# Patient Record
Sex: FEMALE | Race: WHITE | NOT HISPANIC OR LATINO | Employment: OTHER | URBAN - METROPOLITAN AREA
[De-identification: names, ages, dates, MRNs, and addresses within clinical notes are randomized per-mention and may not be internally consistent; named-entity substitution may affect disease eponyms.]

---

## 2023-10-04 ENCOUNTER — OFFICE VISIT (OUTPATIENT)
Dept: OBSTETRICS AND GYNECOLOGY | Facility: CLINIC | Age: 43
End: 2023-10-04
Payer: COMMERCIAL

## 2023-10-04 VITALS
WEIGHT: 132.81 LBS | DIASTOLIC BLOOD PRESSURE: 59 MMHG | HEIGHT: 66 IN | BODY MASS INDEX: 21.34 KG/M2 | SYSTOLIC BLOOD PRESSURE: 95 MMHG

## 2023-10-04 DIAGNOSIS — L72.0 INCLUSION CYST: Primary | ICD-10-CM

## 2023-10-04 DIAGNOSIS — F32.81 PMDD (PREMENSTRUAL DYSPHORIC DISORDER): ICD-10-CM

## 2023-10-04 PROCEDURE — 99204 PR OFFICE/OUTPT VISIT, NEW, LEVL IV, 45-59 MIN: ICD-10-PCS | Mod: S$GLB,,, | Performed by: OBSTETRICS & GYNECOLOGY

## 2023-10-04 PROCEDURE — 3008F BODY MASS INDEX DOCD: CPT | Mod: CPTII,S$GLB,, | Performed by: OBSTETRICS & GYNECOLOGY

## 2023-10-04 PROCEDURE — 3078F PR MOST RECENT DIASTOLIC BLOOD PRESSURE < 80 MM HG: ICD-10-PCS | Mod: CPTII,S$GLB,, | Performed by: OBSTETRICS & GYNECOLOGY

## 2023-10-04 PROCEDURE — 3078F DIAST BP <80 MM HG: CPT | Mod: CPTII,S$GLB,, | Performed by: OBSTETRICS & GYNECOLOGY

## 2023-10-04 PROCEDURE — 3008F PR BODY MASS INDEX (BMI) DOCUMENTED: ICD-10-PCS | Mod: CPTII,S$GLB,, | Performed by: OBSTETRICS & GYNECOLOGY

## 2023-10-04 PROCEDURE — 1159F PR MEDICATION LIST DOCUMENTED IN MEDICAL RECORD: ICD-10-PCS | Mod: CPTII,S$GLB,, | Performed by: OBSTETRICS & GYNECOLOGY

## 2023-10-04 PROCEDURE — 3074F PR MOST RECENT SYSTOLIC BLOOD PRESSURE < 130 MM HG: ICD-10-PCS | Mod: CPTII,S$GLB,, | Performed by: OBSTETRICS & GYNECOLOGY

## 2023-10-04 PROCEDURE — 1159F MED LIST DOCD IN RCRD: CPT | Mod: CPTII,S$GLB,, | Performed by: OBSTETRICS & GYNECOLOGY

## 2023-10-04 PROCEDURE — 3074F SYST BP LT 130 MM HG: CPT | Mod: CPTII,S$GLB,, | Performed by: OBSTETRICS & GYNECOLOGY

## 2023-10-04 PROCEDURE — 99204 OFFICE O/P NEW MOD 45 MIN: CPT | Mod: S$GLB,,, | Performed by: OBSTETRICS & GYNECOLOGY

## 2023-10-04 RX ORDER — METHOTREXATE 2.5 MG/1
TABLET ORAL
COMMUNITY
Start: 2023-04-15

## 2023-10-04 RX ORDER — ESCITALOPRAM OXALATE 20 MG/1
20 TABLET ORAL DAILY
Qty: 90 TABLET | Refills: 4 | Status: SHIPPED | OUTPATIENT
Start: 2023-10-04 | End: 2023-12-06 | Stop reason: SDUPTHER

## 2023-10-04 RX ORDER — ESCITALOPRAM OXALATE 10 MG/1
10 TABLET ORAL DAILY
Qty: 7 TABLET | Refills: 0 | Status: SHIPPED | OUTPATIENT
Start: 2023-10-04 | End: 2023-12-06 | Stop reason: DRUGHIGH

## 2023-10-04 NOTE — PROGRESS NOTES
"Gynecology Visit  History & Physical      SUBJECTIVE:     History of Present Illness:  Patient is a 43 y.o. female presents complaining of a cyst in her perineum.  Reports she noticed today while taking a shower.  Reports that it itches.    She is also complaining of severe mood swings surrounding her period. She reports a recent diagnosis of rheumatoid arthritis.  She is a hairdresser.  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  She is several hand surgery scheduled.      Review of patient's allergies indicates:  No Known Allergies    Current Outpatient Medications   Medication Sig Dispense Refill    INFUSION ADAPTER, CLOSED SYSTM MISC by Misc.(Non-Drug; Combo Route) route.      methotrexate 2.5 MG Tab SMARTSI Tablet(s) By Mouth Once a Week       No current facility-administered medications for this visit.     OB History          4    Para   3    Term   3            AB   1    Living             SAB   1    IAB        Ectopic        Multiple        Live Births                 Patient's last menstrual period was 2023.      Past Medical History:   Diagnosis Date    Rheumatoid arthritis, unspecified      Past Surgical History:   Procedure Laterality Date    TUBAL LIGATION       History reviewed. No pertinent family history.  Social History     Tobacco Use    Smoking status: Never    Smokeless tobacco: Never   Substance Use Topics    Alcohol use: Yes    Drug use: Never        OBJECTIVE:     Vital Signs (Most Recent)  BP: (!) 95/59 (10/04/23 1303)  5' 6" (1.676 m)  60.2 kg (132 lb 12.8 oz)     Physical Exam:  Physical Exam  Vitals reviewed.   Constitutional:       Appearance: Normal appearance.   HENT:      Head: Normocephalic and atraumatic.   Pulmonary:      Effort: Pulmonary effort is normal.   Genitourinary:         Comments: 0.5cm inclusion cyst present in the perineum  Neurological:      General: No focal deficit present.      Mental Status: She is alert and oriented to person, " place, and time.   Psychiatric:         Mood and Affect: Mood normal.         Behavior: Behavior normal.         ASSESSMENT/PLAN:       ICD-10-CM ICD-9-CM   1. Inclusion cyst  L72.0 706.2   2. PMDD (premenstrual dysphoric disorder)  F32.81 625.4       PLAN:    --Reviewed treatment options for inclusion cyst including observation versus surgical removal.  Patient desires observation.  If she desires surgical removal in the future, recommend 2nd opinion from a colorectal surgeon given location.  She is a history of C-sections and no vaginal deliveries or perineal lacerations.  --Recommend starting antidepressant for PMDD.  Patient reports taking Lexapro in the past with good results.  --Follow-up in 1 month to assess PMDD.  Will perform annual exam with Pap smear screening at that time.      Contraception:  Status post BTL      Ariadna Macias MD   Gynecology    [address]  [address]  [place] [address] [phone]

## 2023-12-06 ENCOUNTER — OFFICE VISIT (OUTPATIENT)
Dept: OBSTETRICS AND GYNECOLOGY | Facility: CLINIC | Age: 43
End: 2023-12-06
Payer: COMMERCIAL

## 2023-12-06 VITALS
HEIGHT: 66 IN | WEIGHT: 138.19 LBS | SYSTOLIC BLOOD PRESSURE: 110 MMHG | BODY MASS INDEX: 22.21 KG/M2 | DIASTOLIC BLOOD PRESSURE: 68 MMHG

## 2023-12-06 DIAGNOSIS — Z12.4 CERVICAL CANCER SCREENING: ICD-10-CM

## 2023-12-06 DIAGNOSIS — Z01.419 WOMEN'S ANNUAL ROUTINE GYNECOLOGICAL EXAMINATION: Primary | ICD-10-CM

## 2023-12-06 PROCEDURE — 3008F BODY MASS INDEX DOCD: CPT | Mod: CPTII,S$GLB,, | Performed by: OBSTETRICS & GYNECOLOGY

## 2023-12-06 PROCEDURE — 3074F PR MOST RECENT SYSTOLIC BLOOD PRESSURE < 130 MM HG: ICD-10-PCS | Mod: CPTII,S$GLB,, | Performed by: OBSTETRICS & GYNECOLOGY

## 2023-12-06 PROCEDURE — 3074F SYST BP LT 130 MM HG: CPT | Mod: CPTII,S$GLB,, | Performed by: OBSTETRICS & GYNECOLOGY

## 2023-12-06 PROCEDURE — 99396 PR PREVENTIVE VISIT,EST,40-64: ICD-10-PCS | Mod: S$GLB,,, | Performed by: OBSTETRICS & GYNECOLOGY

## 2023-12-06 PROCEDURE — 3078F PR MOST RECENT DIASTOLIC BLOOD PRESSURE < 80 MM HG: ICD-10-PCS | Mod: CPTII,S$GLB,, | Performed by: OBSTETRICS & GYNECOLOGY

## 2023-12-06 PROCEDURE — 88175 CYTOPATH C/V AUTO FLUID REDO: CPT | Performed by: OBSTETRICS & GYNECOLOGY

## 2023-12-06 PROCEDURE — 99396 PREV VISIT EST AGE 40-64: CPT | Mod: S$GLB,,, | Performed by: OBSTETRICS & GYNECOLOGY

## 2023-12-06 PROCEDURE — 3078F DIAST BP <80 MM HG: CPT | Mod: CPTII,S$GLB,, | Performed by: OBSTETRICS & GYNECOLOGY

## 2023-12-06 PROCEDURE — 3008F PR BODY MASS INDEX (BMI) DOCUMENTED: ICD-10-PCS | Mod: CPTII,S$GLB,, | Performed by: OBSTETRICS & GYNECOLOGY

## 2023-12-06 RX ORDER — ESCITALOPRAM OXALATE 20 MG/1
20 TABLET ORAL DAILY
Qty: 90 TABLET | Refills: 4 | Status: SHIPPED | OUTPATIENT
Start: 2023-12-06 | End: 2024-12-05

## 2023-12-06 NOTE — PROGRESS NOTES
"Annual Well Woman's Exam  History & Physical      SUBJECTIVE:     History of Present Illness:  Patient is a 43 y.o. female presents for her annual exam. Reports improved mood on Lexapro. Reports fatigue after her infusions for RA.     She recently bought pigtails and crewcuts and is planning to open this week.     Chief Complaint   Patient presents with    Well Woman       Review of patient's allergies indicates:  No Known Allergies    Current Outpatient Medications   Medication Sig Dispense Refill    EScitalopram oxalate (LEXAPRO) 10 MG tablet Take 1 tablet (10 mg total) by mouth once daily. For 1 week to titrate to Lexapro 20mg 7 tablet 0    EScitalopram oxalate (LEXAPRO) 20 MG tablet Take 1 tablet (20 mg total) by mouth once daily. 90 tablet 4    INFUSION ADAPTER, CLOSED SYSTM MISC by Misc.(Non-Drug; Combo Route) route.      methotrexate 2.5 MG Tab SMARTSI Tablet(s) By Mouth Once a Week       No current facility-administered medications for this visit.     OB History          4    Para   3    Term   3            AB   1    Living             SAB   1    IAB        Ectopic        Multiple        Live Births                 Patient's last menstrual period was 2023.      Past Medical History:   Diagnosis Date    Rheumatoid arthritis, unspecified      Past Surgical History:   Procedure Laterality Date    TUBAL LIGATION       No family history on file.  Social History     Tobacco Use    Smoking status: Never    Smokeless tobacco: Never   Substance Use Topics    Alcohol use: Yes    Drug use: Never        OBJECTIVE:     Vital Signs (Most Recent)  BP: 110/68 (23 1336)  5' 6" (1.676 m)  62.7 kg (138 lb 3.2 oz)     Physical Exam:  Physical Exam  Vitals reviewed.   Constitutional:       Appearance: Normal appearance.   HENT:      Head: Normocephalic.   Neck:      Thyroid: No thyroid mass, thyromegaly or thyroid tenderness.   Pulmonary:      Effort: Pulmonary effort is normal.   Chest: "   Breasts:     Right: Normal.      Left: Normal.      Comments: Bilateral breast implants present  Abdominal:      General: Abdomen is flat.      Palpations: Abdomen is soft.   Genitourinary:     General: Normal vulva.      Vagina: Normal.      Cervix: Normal.      Uterus: Normal.       Adnexa: Right adnexa normal and left adnexa normal.   Lymphadenopathy:      Upper Body:      Right upper body: No axillary adenopathy.      Left upper body: No axillary adenopathy.   Skin:     General: Skin is warm and dry.   Neurological:      General: No focal deficit present.      Mental Status: She is alert and oriented to person, place, and time.   Psychiatric:         Mood and Affect: Mood normal.         Behavior: Behavior normal.         ASSESSMENT/PLAN:       ICD-10-CM ICD-9-CM   1. Women's annual routine gynecological examination  Z01.419 V72.31   2. Cervical cancer screening  Z12.4 V76.2       PLAN:    Pap today  Mammogram ordered.  Lexapro refill ordered  Contraception: BTL  Follow up in 1 year for annual exam or sooner as needed    Ariadna Macias MD   Gynecology    [address]  [address]  [place] [address] [phone]

## 2023-12-17 LAB
FINAL PATHOLOGIC DIAGNOSIS: NORMAL
Lab: NORMAL

## 2024-05-07 ENCOUNTER — OFFICE VISIT (OUTPATIENT)
Dept: OBSTETRICS AND GYNECOLOGY | Facility: CLINIC | Age: 44
End: 2024-05-07
Payer: COMMERCIAL

## 2024-05-07 VITALS
DIASTOLIC BLOOD PRESSURE: 72 MMHG | WEIGHT: 139.63 LBS | BODY MASS INDEX: 22.44 KG/M2 | HEIGHT: 66 IN | SYSTOLIC BLOOD PRESSURE: 118 MMHG

## 2024-05-07 DIAGNOSIS — D72.819 LEUKOPENIA, UNSPECIFIED TYPE: ICD-10-CM

## 2024-05-07 DIAGNOSIS — R53.83 FATIGUE, UNSPECIFIED TYPE: ICD-10-CM

## 2024-05-07 DIAGNOSIS — E03.8 SUBCLINICAL HYPOTHYROIDISM: ICD-10-CM

## 2024-05-07 DIAGNOSIS — N89.8 VAGINAL ITCHING: Primary | ICD-10-CM

## 2024-05-07 PROCEDURE — 3008F BODY MASS INDEX DOCD: CPT | Mod: CPTII,S$GLB,, | Performed by: OBSTETRICS & GYNECOLOGY

## 2024-05-07 PROCEDURE — 3074F SYST BP LT 130 MM HG: CPT | Mod: CPTII,S$GLB,, | Performed by: OBSTETRICS & GYNECOLOGY

## 2024-05-07 PROCEDURE — 1159F MED LIST DOCD IN RCRD: CPT | Mod: CPTII,S$GLB,, | Performed by: OBSTETRICS & GYNECOLOGY

## 2024-05-07 PROCEDURE — 81514 NFCT DS BV&VAGINITIS DNA ALG: CPT | Performed by: OBSTETRICS & GYNECOLOGY

## 2024-05-07 PROCEDURE — 3078F DIAST BP <80 MM HG: CPT | Mod: CPTII,S$GLB,, | Performed by: OBSTETRICS & GYNECOLOGY

## 2024-05-07 PROCEDURE — 99214 OFFICE O/P EST MOD 30 MIN: CPT | Mod: S$GLB,,, | Performed by: OBSTETRICS & GYNECOLOGY

## 2024-05-07 RX ORDER — FLUCONAZOLE 150 MG/1
150 TABLET ORAL DAILY
Qty: 1 TABLET | Refills: 0 | Status: SHIPPED | OUTPATIENT
Start: 2024-05-07 | End: 2024-05-08

## 2024-05-07 RX ORDER — LEVOTHYROXINE SODIUM 25 UG/1
25 TABLET ORAL
Qty: 90 TABLET | Refills: 4 | Status: SHIPPED | OUTPATIENT
Start: 2024-05-07 | End: 2025-05-07

## 2024-05-07 NOTE — PROGRESS NOTES
"Gynecology Visit     Subjective:       Patient ID: Todd Erickson is a 43 y.o. female.    Chief Complaint:  hormones  and Vulvar Itch      History of Present Illness  43-year-old  presents complaining of extreme fatigue.  She has rheumatoid arthritis and receives Actemra infusions for this.  She reports since this medication helps her rheumatoid symptoms significantly, but she has been experiencing hypotension and weight gain.  She is gained 7 lb in the past 8 months.  She also reports that her white count was very low on her last CBC.    She states that her blood pressure was 77/39 after her infusion in January.    Today, she is also complaining of vaginal/vulvar itching    GYN & OB History  Patient's last menstrual period was 2024 (approximate).   Date of Last Pap: 2023    OB History    Para Term  AB Living   4 3 3   1     SAB IAB Ectopic Multiple Live Births   1              # Outcome Date GA Lbr Ravinder/2nd Weight Sex Type Anes PTL Lv   4 SAB            3 Term            2 Term            1 Term                    /72 (BP Location: Right arm, Patient Position: Sitting)   Ht 5' 6" (1.676 m)   Wt 63.3 kg (139 lb 9.6 oz)   LMP 2024 (Approximate)   BMI 22.53 kg/m²     Objective:    Physical Exam:   Constitutional: She is oriented to person, place, and time. She appears well-developed and well-nourished.    HENT:   Head: Normocephalic and atraumatic.       Pulmonary/Chest: Effort normal.          Genitourinary: There is vaginal discharge in the vagina.               Neurological: She is alert and oriented to person, place, and time.     Psychiatric: She has a normal mood and affect.            Labs  2024: TSH 3.06  WBC 2.6  Absolute neutrophils 1.22 L      Assessment:        1. Vaginal itching    2. Fatigue, unspecified type    3. Leukopenia, unspecified type    4. Subclinical hypothyroidism             Plan:      --Vaginosis panel ordered  --Will start Synthroid 25 mcg and " plan to repeat TFTs in 4-6 weeks  --Diflucan ordered for presumed yeast infection.  --I believe most of the patient's symptoms are secondary to her Actemra infusions.  Upon review of the side effect profile, leukopenia, hypotension, pruritus, neutropenia, hypothyroidism, and weight gain were all listed.  The patient has been complaining of all of these symptoms.  --Patient instructed to follow up with Dr. Hamilton to discuss continuing this medication. I also recommended her to weigh the risks and benefits of this medication.  It works very well for her rheumatoid arthritis.      Ariadna Macias MD   Gynecology    [address]   [address]  [place] [address] [phone]

## 2024-05-09 LAB
BACTERIAL VAGINOSIS DNA: NEGATIVE
CANDIDA GLABRATA DNA: NEGATIVE
CANDIDA KRUSEI DNA: NEGATIVE
CANDIDA RRNA VAG QL PROBE: POSITIVE
T VAGINALIS RRNA GENITAL QL PROBE: NEGATIVE